# Patient Record
(demographics unavailable — no encounter records)

---

## 2025-02-24 NOTE — DISCUSSION/SUMMARY
[Medication Risks Reviewed] : Medication risks reviewed [Surgical risks reviewed] : Surgical risks reviewed [de-identified] : reviewed the case and the imaging with the patient  neck and back case  discussion of the condition and treatment options cautions discussed questions answered discussion of natural history of the condition and what the next step would be get the MRi imaging of the cervical  PT shes seeing pain management fu with me in 4 weeks  may need acdf? worrried about the hand not able to return to work  pending hand visit  started with PT

## 2025-02-24 NOTE — HISTORY OF PRESENT ILLNESS
[Dull/Aching] : dull/aching [Localized] : localized [Sharp] : sharp [Intermittent] : intermittent [Nothing helps with pain getting better] : Nothing helps with pain getting better [Exercising] : exercising [Not working due to injury] : Work status: not working due to injury [] : yes [de-identified] : WC DOI 11/8/24 2/24/2025: pt is here following up on neck and lower back. had an EMG and was told she has severe carpal tunnel in ash wrists, is following up with hand specialist. started PT last week. no sig change.   01/20/2025:  MAYURI JI a 59 year old RHD  female presents today for neck and lower back pain injured while at work. 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 Pain is to the right side of the back and shoots down the leg.  She felt left sided neck pain a few days later.  Down both arms - more on the right - The pain is constant.  Feels numbness to both hands and loss of fine motion motions to the right hand, limited motion to the right hand.   Using cane for balance. Notes some incontinence.  Had an old hand injury but its worse after the recent injury   Seen at [facility] ([facility]) where PT was requested - hasnt started yet  MRI's done Taking gabapentin 300mg nightly and cyclobenzaprine 5mg as needed No acupuncture/chirocare No LESI  No prior neck surgery  L4 laminectomy 2017 (Dr Hansen HSS)  Pmhx: anxeity, on weight loss medication (wegovy)  Pshx:  Mensicus repair right knee in dec 2024 Hysterectomy 2007  No hx diabetes/cancer  Occupation:  at [employer] - she worked until 12/4 then out since then   xrays today L-spine 2v - slight spondylolisthesis at L4-5, mild spondylosis  AP pelvis - negative  C-spine 2v - degnerative changes C5-6 disc   MRIs from [facility]- reports MRI L-spine - prior L4-5 decompression   MRI C-spine - 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

## 2025-02-24 NOTE — WORK
[Sprain/Strain] : sprain/strain [Was the competent medical cause of the injury] : was the competent medical cause of the injury [Are consistent with the injury] : are consistent with the injury [Consistent with my objective findings] : consistent with my objective findings [Total (100%)] : total (100%) [Does not reveal pre-existing condition(s) that may affect treatment/prognosis] : does not reveal pre-existing condition(s) that may affect treatment/prognosis [Cannot return to work because ________] : cannot return to work because [unfilled]

## 2025-03-24 NOTE — DATA REVIEWED
[MRI] : MRI [Cervical Spine] : cervical spine [Lumbar Spine] : lumbar spine [Report was reviewed and noted in the chart] : The report was reviewed and noted in the chart 10

## 2025-03-24 NOTE — PHYSICAL EXAM
[3___] : right finger abductors 3[unfilled]/5 [2___] : right grasp 2[unfilled]/5 [Right lower extremity below knee] : right lower extremity below knee [Right lower extremity above knee] : right lower extremity above knee [] : antalgic

## 2025-03-25 NOTE — DISCUSSION/SUMMARY
[Medication Risks Reviewed] : Medication risks reviewed [Surgical risks reviewed] : Surgical risks reviewed [de-identified] : reviewed the case and the imaging with the patient  neck and back case  mostof the pain in the lower back on the right side discussion of the condition and treatment options cautions discussed questions answered discussion of natural history of the condition and what the next step would be get surgery  The patient has tried conservative treatment for this condition including time/activity modification/various medications/therapy/injections without significant relief.  Do not suspect that further conservative treatment will lead to a resolution of symptoms.  Patient remains with persistent activity limited symptoms and is therefore considered for surgical intervention   right sided L4-5 decompression indicated  I've discussed with the patient a decompression surgery    We've discussed the surgery details as well as potential for complications including but not limited to pain, scar and infection. There is also a possibility for recurrent or residual disk herniation, failure or fracture of bone, and need for future surgery. Finally, we discussed potential for injury to nerves, spinal cord or blood vessels, paralysis, blindness, need for transfusion, general anesthesia, allergic reaction, prolonged intubation, myocardial infarction, stroke, deep venous thrombosis, pulmonary embolus, and death.  The surgery may not lead to a satisfactory result.  The patient may face multiple surgeries or prolonged hospitalization.  The patient may need a fusion surgery.  These decisions are ultimately at the discretion of the surgeon in the event the decision needs to be made intraoperatively.  The patient will be intubated for the surgery.  The patient understands these things and all questions are answered to his/her satisfaction.   Spinal fluid leak is the most common complication with this surgery and may lead to hospilitilization, possibly repeat surgery including other procedures such as drain placement.   Patient has been instructed to stop all Aspirin and NSAIDs 10 days prior to surgery date. For Coumadin and other blood thinners, the patient is referred to the medical doctor   We will use neuromonitoring during the surgery in order to keep it as safe as possible.   The patient will need pre-admission testing prior to surgery.   The procedure does not come with a guarantee of success or of satisfaction on the patient's behalf.   At the surgeon's discretion he may call for assistance during the surgery or in the perioperative period

## 2025-03-25 NOTE — HISTORY OF PRESENT ILLNESS
[Dull/Aching] : dull/aching [Localized] : localized [Sharp] : sharp [Intermittent] : intermittent [Nothing helps with pain getting better] : Nothing helps with pain getting better [Exercising] : exercising [Not working due to injury] : Work status: not working due to injury [] : yes [10] : 10 [de-identified] : WC DOI 11/8/24 2/24/2025: pt is here following up on neck and lower back. had an EMG and was told she has severe carpal tunnel in ash wrists, is following up with hand specialist. started PT last week. no sig change.   01/20/2025:  MAYURI JI a 59 year old RHD  female presents today for neck and lower back pain injured while at work. 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 Pain is to the right side of the back and shoots down the leg.  She felt left sided neck pain a few days later.  Down both arms - more on the right - The pain is constant.  Feels numbness to both hands and loss of fine motion motions to the right hand, limited motion to the right hand.   Using cane for balance. Notes some incontinence.  Had an old hand injury but its worse after the recent injury   Se[facility] at [facility] where PT was [facility] - hasnt started yet  MRI's done Taking gabapentin 300mg nightly and cyclobenzaprine 5mg as needed No acupuncture/chirocare No LESI  No prior neck surgery  L4 laminectomy 2017 (Dr Hansen HSS)  Pmhx: anxeity, on weight loss medication (wegovy)  Pshx:  Mensicus repair right knee in dec 2024 Hysterectomy 2007  No hx diabetes/cancer  Occupation:  at [employer] - she worked until 12/4 then out since then   xrays today L-spine 2v - slight spondylolisthesis at L4-5, mild spondylosis  AP pelvis - negative  C-spine 2v - degnerative changes C5-6 disc   MRIs from [facility]- reports MRI L-spine - prior L4-5 decompression   MRI C-spine - stenosis C5-6 with cord compression   -------------------------  2/24/25: Here for fu - symptoms remains in the right arm  progressive numbness in the right arm  less symptoms in the left hand  brought the MRI of the L spine to review the imaging   no relief with the gabapentin  She has pending visit with hand service   she brought an MRI of the lumbar from its not her disc   EMG/NCS UE - 2/3/25 - moderate right and mild left cts   ---------------------------------------------  3/24/25: Here for fu - plan at last was "e MRi imaging of the cervical  PT shes seeing pain management fu with me in 4 weeks  may need acdf? worrried about the hand not able to return to work  pending hand visit  started with PT"  Pain remains mostly in the lower back on the right side down the side of the right leg  Neck pain remains   In PT without relief   MRi C spine - stenosis worst from C4-6 MRi L spine - right sided NF stenosis at L4-5, prior deocmpression at Left sided L4-5  see reports in chart  [de-identified] :

## 2025-03-25 NOTE — DISCUSSION/SUMMARY
[Medication Risks Reviewed] : Medication risks reviewed [Surgical risks reviewed] : Surgical risks reviewed [de-identified] : reviewed the case and the imaging with the patient  neck and back case  mostof the pain in the lower back on the right side discussion of the condition and treatment options cautions discussed questions answered discussion of natural history of the condition and what the next step would be get surgery  The patient has tried conservative treatment for this condition including time/activity modification/various medications/therapy/injections without significant relief.  Do not suspect that further conservative treatment will lead to a resolution of symptoms.  Patient remains with persistent activity limited symptoms and is therefore considered for surgical intervention   right sided L4-5 decompression indicated  I've discussed with the patient a decompression surgery    We've discussed the surgery details as well as potential for complications including but not limited to pain, scar and infection. There is also a possibility for recurrent or residual disk herniation, failure or fracture of bone, and need for future surgery. Finally, we discussed potential for injury to nerves, spinal cord or blood vessels, paralysis, blindness, need for transfusion, general anesthesia, allergic reaction, prolonged intubation, myocardial infarction, stroke, deep venous thrombosis, pulmonary embolus, and death.  The surgery may not lead to a satisfactory result.  The patient may face multiple surgeries or prolonged hospitalization.  The patient may need a fusion surgery.  These decisions are ultimately at the discretion of the surgeon in the event the decision needs to be made intraoperatively.  The patient will be intubated for the surgery.  The patient understands these things and all questions are answered to his/her satisfaction.   Spinal fluid leak is the most common complication with this surgery and may lead to hospilitilization, possibly repeat surgery including other procedures such as drain placement.   Patient has been instructed to stop all Aspirin and NSAIDs 10 days prior to surgery date. For Coumadin and other blood thinners, the patient is referred to the medical doctor   We will use neuromonitoring during the surgery in order to keep it as safe as possible.   The patient will need pre-admission testing prior to surgery.   The procedure does not come with a guarantee of success or of satisfaction on the patient's behalf.   At the surgeon's discretion he may call for assistance during the surgery or in the perioperative period

## 2025-03-25 NOTE — REASON FOR VISIT
[FreeTextEntry2] : F/U - CERVICAL LUMBAR PAIN Pt states shooting & pinching pain since 03/20/25. Pt has tried Physical therapy to relieve pain but no benefit.  DOI:  11/08/24

## 2025-03-25 NOTE — HISTORY OF PRESENT ILLNESS
[Dull/Aching] : dull/aching [Localized] : localized [Sharp] : sharp [Intermittent] : intermittent [Nothing helps with pain getting better] : Nothing helps with pain getting better [Exercising] : exercising [Not working due to injury] : Work status: not working due to injury [] : yes [10] : 10 [de-identified] : WC DOI 11/8/24 2/24/2025: pt is here following up on neck and lower back. had an EMG and was told she has severe carpal tunnel in ash wrists, is following up with hand specialist. started PT last week. no sig change.   01/20/2025:  MAYURI JI a 59 year old RHD  female presents today for neck and lower back pain injured while at work. 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 Pain is to the right side of the back and shoots down the leg.  She felt left sided neck pain a few days later.  Down both arms - more on the right - The pain is constant.  Feels numbness to both hands and loss of fine motion motions to the right hand, limited motion to the right hand.   Using cane for balance. Notes some incontinence.  Had an old hand injury but its worse after the recent injury   [facility] - hasnt started yet  MRI's done Taking gabapentin 300mg nightly and cyclobenzaprine 5mg as needed No acupuncture/chirocare No LESI  No prior neck surgery  L4 laminectomy 2017 (Dr Hansen HSS)  Pmhx: anxeity, on weight loss medication (wegovy)  Pshx:  Mensicus repair right knee in dec 2024 Hysterectomy 2007  No hx diabetes/cancer  Occupation:  at [employer] - she worked until 12/4 then out since then   xrays today L-spine 2v - slight spondylolisthesis at L4-5, mild spondylosis  AP pelvis - negative  C-spine 2v - degnerative changes C5-6 disc   MRIs from [facility]- reports MRI L-spine - prior L4-5 decompression   MRI C-spine - stenosis C5-6 with cord compression   -------------------------  2/24/25: Here for fu - symptoms remains in the right arm  progressive numbness in the right arm  less symptoms in the left hand  brought the MRI of the L spine to review the imaging   no relief with the gabapentin  She has pending visit with hand service   she brought an MRI of the lumbar from its not her disc   EMG/NCS UE - 2/3/25 - moderate right and mild left cts   ---------------------------------------------  3/24/25: Here for fu - plan at last was "e MRi imaging of the cervical  PT shes seeing pain management fu with me in 4 weeks  may need acdf? worrried about the hand not able to return to work  pending hand visit  started with PT"  Pain remains mostly in the lower back on the right side down the side of the right leg  Neck pain remains   In PT without relief   MRi C spine - stenosis worst from C4-6 MRi L spine - right sided NF stenosis at L4-5, prior deocmpression at Left sided L4-5  see reports in chart  [de-identified] :

## 2025-05-12 NOTE — PHYSICAL EXAM
[3___] : right finger abductors 3[unfilled]/5 [2___] : right grasp 2[unfilled]/5 [Right lower extremity below knee] : right lower extremity below knee [Right lower extremity above knee] : right lower extremity above knee [] : ambulation with cane

## 2025-05-12 NOTE — WORK
What Is The Reason For Today's Visit?: History of Melanoma What Stage Is The Melanoma?: Stage 0 Year Excised?: 9/2023 [Sprain/Strain] : sprain/strain [Was the competent medical cause of the injury] : was the competent medical cause of the injury [Are consistent with the injury] : are consistent with the injury [Consistent with my objective findings] : consistent with my objective findings [Total (100%)] : total (100%) [Does not reveal pre-existing condition(s) that may affect treatment/prognosis] : does not reveal pre-existing condition(s) that may affect treatment/prognosis [Cannot return to work because ________] : cannot return to work because [unfilled]

## 2025-05-12 NOTE — DISCUSSION/SUMMARY
[Medication Risks Reviewed] : Medication risks reviewed [Surgical risks reviewed] : Surgical risks reviewed [de-identified] : reviewed the case and the imaging with the patient  neck and back case  mostof the pain in the lower back on the right side discussion of the condition and treatment options cautions discussed questions answered discussion of natural history of the condition and what the next step would be get surgery  The patient has tried conservative treatment for this condition including time/activity modification/various medications/therapy/injections without significant relief.  Do not suspect that further conservative treatment will lead to a resolution of symptoms.  Patient remains with persistent activity limited symptoms and is therefore considered for surgical intervention   right sided L4-5 decompression indicated  I've discussed with the patient a decompression surgery    We've discussed the surgery details as well as potential for complications including but not limited to pain, scar and infection. There is also a possibility for recurrent or residual disk herniation, failure or fracture of bone, and need for future surgery. Finally, we discussed potential for injury to nerves, spinal cord or blood vessels, paralysis, blindness, need for transfusion, general anesthesia, allergic reaction, prolonged intubation, myocardial infarction, stroke, deep venous thrombosis, pulmonary embolus, and death.  The surgery may not lead to a satisfactory result.  The patient may face multiple surgeries or prolonged hospitalization.  The patient may need a fusion surgery.  These decisions are ultimately at the discretion of the surgeon in the event the decision needs to be made intraoperatively.  The patient will be intubated for the surgery.  The patient understands these things and all questions are answered to his/her satisfaction.   Spinal fluid leak is the most common complication with this surgery and may lead to hospilitilization, possibly repeat surgery including other procedures such as drain placement.   Patient has been instructed to stop all Aspirin and NSAIDs 10 days prior to surgery date. For Coumadin and other blood thinners, the patient is referred to the medical doctor   We will use neuromonitoring during the surgery in order to keep it as safe as possible.   The patient will need pre-admission testing prior to surgery.   The procedure does not come with a guarantee of success or of satisfaction on the patient's behalf.   At the surgeon's discretion he may call for assistance during the surgery or in the perioperative period   Had a bad flare up indicated for updated MRi L spine

## 2025-05-12 NOTE — HISTORY OF PRESENT ILLNESS
[10] : 10 [Dull/Aching] : dull/aching [Localized] : localized [Sharp] : sharp [Intermittent] : intermittent [Nothing helps with pain getting better] : Nothing helps with pain getting better [Exercising] : exercising [Not working due to injury] : Work status: not working due to injury [] : yes [de-identified] : WC DOI 11/8/24 2/24/2025: pt is here following up on neck and lower back. had an EMG and was told she has severe carpal tunnel in ash wrists, is following up with hand specialist. started PT last week. no sig change.   01/20/2025:  MAYURI JI a 59 year old RHD  female presents today for neck and lower back pain injured while at work. 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 Pain is to the right side of the back and shoots down the leg.  She felt left sided neck pain a few days later.  Down both arms - more on the right - The pain is constant.  Feels numbness to both hands and loss of fine motion motions to the right hand, limited motion to the right hand.   Using cane for balance. Notes some incontinence.  Had an old hand injury but its worse after the recent injury   [facility] where PT [facility] - hasnt started yet  MRI's done Taking gabapentin 300mg nightly and cyclobenzaprine 5mg as needed No acupuncture/chirocare No LESI  No prior neck surgery  L4 laminectomy 2017 (Dr Hansen HSS)  Pmhx: anxeity, on weight loss medication (wegovy)  Pshx:  Mensicus repair right knee in dec 2024 Hysterectomy 2007  No hx diabetes/cancer  Occupation:  at [employer] - she worked until 12/4 then out since then   xrays today L-spine 2v - slight spondylolisthesis at L4-5, mild spondylosis  AP pelvis - negative  C-spine 2v - degnerative changes C5-6 disc   MRIs from [facility]- reports MRI L-spine - prior L4-5 decompression   MRI C-spine - stenosis C5-6 with cord compression   -------------------------  2/24/25: Here for fu - symptoms remains in the right arm  progressive numbness in the right arm  less symptoms in the left hand  brought the MRI of the L spine to review the imaging   no relief with the gabapentin  She has pending visit with hand service   she brought an MRI of the lumbar from its not her disc   EMG/NCS UE - 2/3/25 - moderate right and mild left cts   ---------------------------------------------  3/24/25: Here for fu - plan at last was "e MRi imaging of the cervical  PT shes seeing pain management fu with me in 4 weeks  may need acdf? worrried about the hand not able to return to work  pending hand visit  started with PT"  Pain remains mostly in the lower back on the right side down the side of the right leg  Neck pain remains   In PT without relief   MRi C spine - stenosis worst from C4-6 MRi L spine - right sided NF stenosis at L4-5, prior deocmpression at Left sided L4-5  see reports in chart   ----------------------------------------------------  5/12/25:  Here for fu on the neck and lower back; pain exacerbated to the right side of the lower back that radiates to the mid thigh. She has been attending PT. She has been seeing pain management and will now try Lyrica.   Not working  reviewed the last MRi was from November  Had a severe pain flare up in March [de-identified] :

## 2025-05-12 NOTE — DISCUSSION/SUMMARY
[Medication Risks Reviewed] : Medication risks reviewed [Surgical risks reviewed] : Surgical risks reviewed [de-identified] : reviewed the case and the imaging with the patient  neck and back case  mostof the pain in the lower back on the right side discussion of the condition and treatment options cautions discussed questions answered discussion of natural history of the condition and what the next step would be get surgery  The patient has tried conservative treatment for this condition including time/activity modification/various medications/therapy/injections without significant relief.  Do not suspect that further conservative treatment will lead to a resolution of symptoms.  Patient remains with persistent activity limited symptoms and is therefore considered for surgical intervention   right sided L4-5 decompression indicated  I've discussed with the patient a decompression surgery    We've discussed the surgery details as well as potential for complications including but not limited to pain, scar and infection. There is also a possibility for recurrent or residual disk herniation, failure or fracture of bone, and need for future surgery. Finally, we discussed potential for injury to nerves, spinal cord or blood vessels, paralysis, blindness, need for transfusion, general anesthesia, allergic reaction, prolonged intubation, myocardial infarction, stroke, deep venous thrombosis, pulmonary embolus, and death.  The surgery may not lead to a satisfactory result.  The patient may face multiple surgeries or prolonged hospitalization.  The patient may need a fusion surgery.  These decisions are ultimately at the discretion of the surgeon in the event the decision needs to be made intraoperatively.  The patient will be intubated for the surgery.  The patient understands these things and all questions are answered to his/her satisfaction.   Spinal fluid leak is the most common complication with this surgery and may lead to hospilitilization, possibly repeat surgery including other procedures such as drain placement.   Patient has been instructed to stop all Aspirin and NSAIDs 10 days prior to surgery date. For Coumadin and other blood thinners, the patient is referred to the medical doctor   We will use neuromonitoring during the surgery in order to keep it as safe as possible.   The patient will need pre-admission testing prior to surgery.   The procedure does not come with a guarantee of success or of satisfaction on the patient's behalf.   At the surgeon's discretion he may call for assistance during the surgery or in the perioperative period   Had a bad flare up indicated for updated MRi L spine

## 2025-06-23 NOTE — DISCUSSION/SUMMARY
[Medication Risks Reviewed] : Medication risks reviewed [Surgical risks reviewed] : Surgical risks reviewed [de-identified] : reviewed the case and the imaging with the patient  neck and back case  reviewed the updated MRi - worsening clinical findings  mostof the pain in the lower back on the right side discussion of the condition and treatment options cautions discussed questions answered discussion of natural history of the condition and what the next step would be get surgery  The patient has tried conservative treatment for this condition including time/activity modification/various medications/therapy/injections without significant relief.  Do not suspect that further conservative treatment will lead to a resolution of symptoms.  Patient remains with persistent activity limited symptoms and is therefore considered for surgical intervention   Discussed with the patient laminecotomy and/or fusion L3-5    We've discussed the surgery details including instrumentation and grafting options (local, allograft, ICBG, and biologics) as well as potential for complications including but not limited to pain, scar and infection. There is also a possibility for hardware complication such as malposition of hardware, hardware loosening, pullout, failure or fracture of bone, adjacent segment disease, pseudarthrosis, and need for future surgery. We discussed potential for injury to nerves, spinal cord or blood vessels, paralysis, blindness, need for transfusion, general anesthesia, allergic reaction, prolonged intubation, myocardial infarction, stroke, deep venous thrombosis, pulmonary embolus, and death.  Spinal fluid leak may occur and may require prolonged time in the hospital and also further surgical procedures including drain placement.  The patient understands these things and all questions are answered to his/her satisfaction.  We discussed other surgical options.    Patient has been instructed to stop all Aspirin and NSAIDs 10 days prior to surgery date. For Coumadin and other blood thinners, the patient is referred to the medical doctor   The patient will need a medical clearance and pre-admission testing prior to surgery   We will use neuromonitoring in order to keep things as safe as possible.   The procedure does not come with a guarantee of success or of satisfaction on the patient's behalf   At the surgeon's discretion he may call for assistance during the surgery or in the perioperative period  FU 2 MONTHS

## 2025-06-23 NOTE — HISTORY OF PRESENT ILLNESS
[10] : 10 [Dull/Aching] : dull/aching [Localized] : localized [Sharp] : sharp [Intermittent] : intermittent [Nothing helps with pain getting better] : Nothing helps with pain getting better [Exercising] : exercising [Not working due to injury] : Work status: not working due to injury [] : yes [de-identified] : WC DOI 11/8/24 2/24/2025: pt is here following up on neck and lower back. had an EMG and was told she has severe carpal tunnel in ash wrists, is following up with hand specialist. started PT last week. no sig change.   01/20/2025:  MAYURI JI a 59 year old RHD  female presents today for neck and lower back pain injured while at work. 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 Pain is to the right side of the back and shoots down the leg.  She felt left sided neck pain a few days later.  Down both arms - more on the right - The pain is constant.  Feels numbness to both hands and loss of fine motion motions to the right hand, limited motion to the right hand.   Using cane for balance. Notes some incontinence.  Had an old hand injury but its worse after the recent injury   [facility] - hasnt started yet  MRI's done Taking gabapentin 300mg nightly and cyclobenzaprine 5mg as needed No acupuncture/chirocare No LESI  No prior neck surgery  L4 laminectomy 2017 (Dr Hansen [facility])  Pmhx: anxeity, on weight loss medication (wegovy)  Pshx:  Mensicus repair right knee in dec 2024 Hysterectomy 2007  No hx diabetes/cancer  Occupation:  at [employer] - she worked until 12/4 then out since then   xrays today L-spine 2v - slight spondylolisthesis at L4-5, mild spondylosis  AP pelvis - negative  C-spine 2v - degnerative changes C5-6 disc   MRIs from [facility]- reports MRI L-spine - prior L4-5 decompression   MRI C-spine - stenosis C5-6 with cord compression   -------------------------  2/24/25: Here for fu - symptoms remains in the right arm  progressive numbness in the right arm  less symptoms in the left hand  brought the MRI of the L spine to review the imaging   no relief with the gabapentin  She has pending visit with hand service   she brought an MRI of the lumbar from its not her disc   EMG/NCS UE - 2/3/25 - moderate right and mild left cts   ---------------------------------------------  3/24/25: Here for fu - plan at last was "e MRi imaging of the cervical  PT shes seeing pain management fu with me in 4 weeks  may need acdf? worrried about the hand not able to return to work  pending hand visit  started with PT"  Pain remains mostly in the lower back on the right side down the side of the right leg  Neck pain remains   In PT without relief   MRi C spine - stenosis worst from C4-6 MRi L spine - right sided NF stenosis at L4-5, prior deocmpression at Left sided L4-5  see reports in chart   ----------------------------------------------------  5/12/25:  Here for fu on the neck and lower back; pain exacerbated to the right side of the lower back that radiates to the mid thigh. She has been attending PT. She has been seeing pain management and will now try Lyrica.   Not working  reviewed the last MRi was from November  Had a severe pain flare up in March 06/23/2025: here to fu on lumbar spine w/ MRI results. not working. ambulating with cane. PT and chiropractor was approved.  activity very limited   MRi l spine - [facility] -  1.  Increase in size of T12-L1 disc extrusion, demonstrated only on sagittal images. 2.  Progression of spondylosis L3-L4 and L4-L5.  [de-identified] :